# Patient Record
(demographics unavailable — no encounter records)

---

## 2024-12-10 NOTE — HISTORY OF PRESENT ILLNESS
[FreeTextEntry1] : ORIGINAL PRESENTATION: This is a 62-year-old woman complaining of lower back, neck, left shoulder, and bilateral knee pain. The patient has had this pain for many years. The pain started after a work-related injury which took place in 2014. Patient describes pain as moderate to severe. During the last month the pain has been nearly constant with symptoms worse in the morning. Pain described as an pressure-like. Pain is associated with numbness and pins and needles into the left lower extremity. Patient has weakness in the left lower extremity. Pain is increased with lying down, standing, sitting, walking, and coughing/sneezing.  Pain is decreased with we encourage a home exercise program, stressing walking and strengthening of the core. We have recommended exercises such as the modified plank, Luba exercise, elliptical , recumbent bike, as well as shoulder griddle strengthening. We discussed recreational activities such as swimming, Gautam Chi and yoga. Use things that heat like hot shower or icy heat before rehab and exercising and at the beginning of the day, and ice (ice in a bag never directly on the skin) after activity and at the end of the day.. Pain is not changed with, relaxation, and bowel movements. Bowel or bladder habits have not changed.   ACTIVITIES: Patient could walk a few blocks before the pain starts. Patient can sit 10 minutes before pain starts. Patient can stand 5 to 10 minutes before pain starts. The patient sometimes lays down because of pain. Patient uses a cane at this time. Patient has difficulty going to work and exercising at this time.  PRIOR PAIN TREATMENTS: Moderate relief with surgery, nerve block/injection, physical therapy, and chiropractic manipulation.  PRIOR PAIN MEDICATIONS: Hydrocodone, Tylenol, oxycodone, and tramadol  PATIENT PRESENTS FOR FOLLOW UP: Patient presents for a revisit. There has been no new complaints or acute changes since last visit.  Patient continues to have lower back pain, neck, bilateral knee pain and L shoulder pain. She sees Dr. Hung in orthopedics. She underwent a left knee cortisone injection in May 2024 and underwent bilateral gel injections in October.   She is currently undergoing PT for her back and knees, which helps her. She is S/P L5-S1 interlaminar KAYLA on 1/9/24 and states that there is still 50% pain reduction and improvement of functioning after the injection. She has wanted to hold off on another lumbar injection.

## 2024-12-10 NOTE — DATA REVIEWED
[FreeTextEntry1] : MRI of the lumbar spine dated 11/30/2020: Grade 1 out of 2 anterolisthesis at L4-5 with moderate to severe spinal stenosis and bilateral neuroforaminal narrowing identified.  Mild disc bulging at L5-S1 with moderate left and mild right neuroforaminal narrowing noted.  Bilateral facet arthropathy at L4-5 and L5-S1.  Cholelithiasis.  SOAPP: low risk 12/01/23 Patient has combination of a low risk SOAP and no risk factors. UDS would be repeated randomly every quarter.  UDS: No data obtained today.  [place] REGISTRY: Checked.

## 2024-12-10 NOTE — DISCUSSION/SUMMARY
[de-identified] : This is a 62-year-old female with complaints of lower back, left shoulder, and bilateral knee pain. The patient has had this pain for many years. The pain started after a work-related injury which took place in 2014. She is S/P L5-S1 interlaminar KAYLA. She states that she is feeling 50% with the injection. She will hold off on further injections for now. She is seeing Dr. Hung for her knees. She will continue with PT. Patient will follow up in 3 months. She is aware to contact me with any questions or concerns in the interim.  Total clinician time spent today on the patient is 30 minutes including preparing to see the patient, obtaining and/or reviewing and confirming history, performing medically necessary and appropriate examination, counseling and educating the patient and/or family, documenting clinical information in the EHR and communicating and/or referring to other healthcare professionals.  MINA Sarkar MD

## 2024-12-10 NOTE — PHYSICAL EXAM
[Bilateral] : knee bilaterally [] : tenderness [de-identified] : GENERAL APPEARANCE OF PATIENT IS WELL DEVELOPED, WELL NOURISHED, BODY HABITUS NORMAL, WELL GROOMED, NO DEFORMITIES NOTED.  Head - Atraumatic and Normocephalic  Eyes, Nose, and Throat: External inspection of ears and nose are normal overall without scars, lesions, or masses noted. Assessment of hearing is normal Neck-Examination of neck shows no masses, overall appearance is normal, neck is symmetric, tracheal position is midline, no crepitus is noted. Examination of thyroid shows no enlargement, tenderness or masses Respiratory- Assessment of respiratory effort shows no intercostal retractions, no use of accessory muscles, unlabored breathing, and normal diaphragmatic movement. Cardiovascular- Examination of extremities show no edema or varicosities Musculoskeletal. Examination of gait is not antalgic and station is normal Inspection and palpation of digits and nails shows no clubbing, cyanosis, nodules, drainage, fluctuance, petechiae   Spine - straight leg positive on left. Bilateral facet tenderness at L3-S1.     Neck- pain at end range flexion. Left C3-6 facet tenderness and loading.     RUE- 170 degrees in abduction bilaterally.     LUE- pain in the triceps and biceps on the left.     RLE- inspection and palpation shows no misalignment, asymmetry, crepitation, defects, tenderness, masses, effusions. ROM is normal without crepitation or contracture. No instability or subluxation or laxity is noted. No abnormal movements.    LLE- inspection and palpation shows no misalignment, asymmetry, crepitation, defects, tenderness, masses, effusions. ROM is normal without crepitation or contracture. No instability or subluxation or laxity is noted. No abnormal movements.    Skin- Inspection of skin and subcutaneous tissue shows no rashes, lesions or ulcers. Palpation of skin and subcutaneous tissue shows no rashes, no indurations, subcutaneous nodules or tightening.    Abdomen- Nontender    Neurologic- CN 2-12 are grossly intact. No sensory or motor deficits in the upper and lower extremities. Adequate strength in upper and lower extremities     Psychiatric- Patients judgment and insight are intact. Oriented to time, place and person. Recent and remote memory intact.

## 2025-01-29 NOTE — ASSESSMENT
[FreeTextEntry1] : plateaued with her weight loss  recommended  pause physical therapy for both knees  continue with pain management consultation for her back consider another injection? MRI lumbar spine 11/30/2022 noted moderate multilevel disc disease   She is totally disabled unable to work I will see her in 3 months  I may reevaluate whether or not to repeat the gel she does have follow-up with pain management

## 2025-01-29 NOTE — IMAGING
[de-identified] :  impingement right shoulder with positive Arellano sign negative [place]'s Speed test little weakness some crepitus spasm in the limited motion positive straight leg on left negative on the right both knees have some crepitus antalgic gait limits in flexion mild effusion negative Homans sign\par  \par   MRI lumbar spine 11/30/2022: Grade 1 to anterolisthesis L4-5 with moderate severe stenosis bulging disc L5-S1 moderate left mild right neural foraminal narrowing facet arthritis

## 2025-01-29 NOTE — REASON FOR VISIT
[FreeTextEntry2] : Patient is coming in for WC DOI 02/28/2014 for bilateral knees neck and back. Lost 8 pounds using Aspercreme and Tylenol the gel helped her knees just finished therapy

## 2025-01-29 NOTE — HISTORY OF PRESENT ILLNESS
[de-identified] : Patient Complaint - Patient continues to have pain in her knees left more than right right shoulder and low back.\par  numbness in the left thigh persists \par  she did have a bariatric sleeve procedure November 3rd she has lost 157+ lbs did resume therapy in April for back and\par  shoulders still having some numbness more in the right hand\par  injection left knee last visit\par  History of Present Illness\par  Cortisone injection provided several months ago to the left knee helped for about 3 to 4 weeks she still using the cane\par  she reports spasm in the low back pain in the right shoulder her gait is antalgic to the left she still using tramadol she i\par  still working with the bariatric program bariatric surgery is now anticipated for hopefully end of September or early\par  October\par  Surgery right rotator cuff 01/13/2017\par  She misplaced her cane recently she also reports some numbness in both thighs also some numbness in her right arm\par  finger tips the neurologist she sees is sending her for MRI CT scan I requested I get copies when they are done

## 2025-01-29 NOTE — HISTORY OF PRESENT ILLNESS
[de-identified] : Patient Complaint - Patient continues to have pain in her knees left more than right right shoulder and low back.\par  numbness in the left thigh persists \par  she did have a bariatric sleeve procedure November 3rd she has lost 157+ lbs did resume therapy in April for back and\par  shoulders still having some numbness more in the right hand\par  injection left knee last visit\par  History of Present Illness\par  Cortisone injection provided several months ago to the left knee helped for about 3 to 4 weeks she still using the cane\par  she reports spasm in the low back pain in the right shoulder her gait is antalgic to the left she still using tramadol she i\par  still working with the bariatric program bariatric surgery is now anticipated for hopefully end of September or early\par  October\par  Surgery right rotator cuff 01/13/2017\par  She misplaced her cane recently she also reports some numbness in both thighs also some numbness in her right arm\par  finger tips the neurologist she sees is sending her for MRI CT scan I requested I get copies when they are done

## 2025-01-29 NOTE — IMAGING
[de-identified] :  impingement right shoulder with positive Arellano sign negative [place]'s Speed test little weakness some crepitus spasm in the limited motion positive straight leg on left negative on the right both knees have some crepitus antalgic gait limits in flexion mild effusion negative Homans sign\par  \par   MRI lumbar spine 11/30/2022: Grade 1 to anterolisthesis L4-5 with moderate severe stenosis bulging disc L5-S1 moderate left mild right neural foraminal narrowing facet arthritis

## 2025-03-11 NOTE — PHYSICAL EXAM
[Bilateral] : knee bilaterally [] : tenderness [de-identified] : GENERAL APPEARANCE OF PATIENT IS WELL DEVELOPED, WELL NOURISHED, BODY HABITUS NORMAL, WELL GROOMED, NO DEFORMITIES NOTED.  Head - Atraumatic and Normocephalic  Eyes, Nose, and Throat: External inspection of ears and nose are normal overall without scars, lesions, or masses noted. Assessment of hearing is normal Neck-Examination of neck shows no masses, overall appearance is normal, neck is symmetric, tracheal position is midline, no crepitus is noted. Examination of thyroid shows no enlargement, tenderness or masses Respiratory- Assessment of respiratory effort shows no intercostal retractions, no use of accessory muscles, unlabored breathing, and normal diaphragmatic movement. Cardiovascular- Examination of extremities show no edema or varicosities Musculoskeletal. Examination of gait is not antalgic and station is normal Inspection and palpation of digits and nails shows no clubbing, cyanosis, nodules, drainage, fluctuance, petechiae   Spine - straight leg positive on left. Bilateral facet tenderness at L3-S1.     Neck- pain at end range flexion. Left C3-6 facet tenderness and loading.     RUE- 170 degrees in abduction bilaterally.     LUE- pain in the triceps and biceps on the left.     RLE- inspection and palpation shows no misalignment, asymmetry, crepitation, defects, tenderness, masses, effusions. ROM is normal without crepitation or contracture. No instability or subluxation or laxity is noted. No abnormal movements.    LLE- inspection and palpation shows no misalignment, asymmetry, crepitation, defects, tenderness, masses, effusions. ROM is normal without crepitation or contracture. No instability or subluxation or laxity is noted. No abnormal movements.    Skin- Inspection of skin and subcutaneous tissue shows no rashes, lesions or ulcers. Palpation of skin and subcutaneous tissue shows no rashes, no indurations, subcutaneous nodules or tightening.    Abdomen- Nontender    Neurologic- CN 2-12 are grossly intact. No sensory or motor deficits in the upper and lower extremities. Adequate strength in upper and lower extremities     Psychiatric- Patients judgment and insight are intact. Oriented to time, place and person. Recent and remote memory intact.

## 2025-03-11 NOTE — HISTORY OF PRESENT ILLNESS
[FreeTextEntry1] : ORIGINAL PRESENTATION: This is a 63-year-old woman complaining of lower back, neck, left shoulder, and bilateral knee pain. The patient has had this pain for many years. The pain started after a work-related injury which took place in 2014. Patient describes pain as moderate to severe. During the last month the pain has been nearly constant with symptoms worse in the morning. Pain described as an pressure-like. Pain is associated with numbness and pins and needles into the left lower extremity. Patient has weakness in the left lower extremity. Pain is increased with lying down, standing, sitting, walking, and coughing/sneezing.  Pain is decreased with we encourage a home exercise program, stressing walking and strengthening of the core. We have recommended exercises such as the modified plank, Luba exercise, elliptical , recumbent bike, as well as shoulder griddle strengthening. We discussed recreational activities such as swimming, Gautam Chi and yoga. Use things that heat like hot shower or icy heat before rehab and exercising and at the beginning of the day, and ice (ice in a bag never directly on the skin) after activity and at the end of the day.. Pain is not changed with, relaxation, and bowel movements. Bowel or bladder habits have not changed.   ACTIVITIES: Patient could walk a few blocks before the pain starts. Patient can sit 10 minutes before pain starts. Patient can stand 5 to 10 minutes before pain starts. The patient sometimes lays down because of pain. Patient uses a cane at this time. Patient has difficulty going to work and exercising at this time.  PRIOR PAIN TREATMENTS: Moderate relief with surgery, nerve block/injection, physical therapy, and chiropractic manipulation.  PRIOR PAIN MEDICATIONS: Hydrocodone, Tylenol, oxycodone, and tramadol  PATIENT PRESENTS FOR FOLLOW UP: Patient presents for a revisit. Patient continues to have lower back pain, neck, bilateral knee pain and L shoulder pain. She sees Dr. Hung in orthopedics. She underwent a left knee cortisone injection in May 2024 and underwent bilateral gel injections in October. She reports and increase in her neck and lower back pain. She admits to upper and lower extremity radiculopathy. Her PT sessions for her lower back and knees ran out about 2 months ago. Symptoms have increased since then. She is S/P L5-S1 interlaminar KAYLA on 1/9/24 and states that there is still 50% pain reduction and improvement of functioning after the injection. She feels the injection has worn off. She has been using Lidocaine 4% patches. She cannot take NSAID's due to a history of a gastric sleeve. She was previously taking Tramadol, which WC stopped paying for. She wishes to resume the medication. She is hopeful that the medication will be covered now.

## 2025-03-11 NOTE — DISCUSSION/SUMMARY
[de-identified] : This is a 63-year-old female with complaints of lower back, left shoulder, and bilateral knee pain. The patient has had this pain for many years. The pain started after a work-related injury which took place in 2014. She is seeing Dr. Hung for her knees. Neck and lower back pain has increased. She is having upper and lower extremity radiculopathy.  A MRI of the cervical and lumbar spine has been recommended to assess for interval change. Authorization for PT of the cervical and lumbar spine was requested today. Patient will undergo a UDS. Once completed, Tramadol will be sent to the pharmacy. For now, I have refilled Lidoderm 4% patches. She will follow up in 6 weeks. She is aware to contact me with any questions or concerns in the interim.  Lumbar MRI was ordered to delineate spine pathology such as disc displacement and stenosis. Cervical MRI was ordered to delineate spine pathology such as disc displacement and stenosis.  Physical therapy of the cervical and lumbar spine 2-3 times a week for 4-8 weeks stressing a home exercise program of walking, shoulder girdle strengthening, swimming, elliptical , recumbent bike, Gautam chi and Yoga. Use things that heat like hot shower or icy heat before rehab, exercising and at the beginning of the day, and ice (ice in a bag never directly on the skin) after activity and at the end of the day.  I have consulted the  registry for the purpose of reviewing the patient's controlled substance.  The patient will return to the office in 6 weeks' time and is aware to contact me with any question or concerns in the interim.  MINA Sarkar MD

## 2025-03-17 NOTE — HISTORY OF PRESENT ILLNESS
[de-identified] : 63-year-old female comes in today bilateral Synvisc 1 injection.  Under the care of Dr. Hung.

## 2025-03-17 NOTE — IMAGING
[de-identified] : On examination of the right and left knee no swelling, no ecchymosis, no erythema.  Skin is intact.  Tenderness along the joint line bilaterally.  Calves are soft nontender.  Good motion to knee flexion and extension bilaterally

## 2025-03-19 NOTE — PROCEDURE
[Epistaxis] : evaluation of epistaxis [Anterior rhinoscopy insufficient to account for symptoms] : anterior rhinoscopy insufficient to account for symptoms [Rigid Endoscope] : examined with a rigid endoscope [Normal] : the paranasal sinuses had no abnormalities [FreeTextEntry1] : dry nasAL MUCOSA

## 2025-03-19 NOTE — HISTORY OF PRESENT ILLNESS
[FreeTextEntry1] : Patient returns today following up on dizziness, BPPV, epistaxis. States that she is still having increased dizziness. Feels dizziness has worsened. Especially when laying down. Did not go to Vestibular Therapy. Would like a new script for therapy.  Also noticed that around Dec 2024/ Jan 2025 she had a sinus infection. Since then, she wakes up extremely congested. When blowing her nose she has frequent nosebleeds. Left nostril worse than right nostril. She has stopped Flonase because she finds it worsens her symptoms.

## 2025-03-19 NOTE — ASSESSMENT
[FreeTextEntry1] : I discussed with the patient the pathophysiology of anterior epistaxis. I showed on a drawing the location of the anterior vascular area. I explained the risk factors including but not limited to nasal dryness, and recommended nasal moisturizing and avoiding any intranasal trauma. I also demonstrated how to stop a bleeding if it happens again.

## 2025-04-07 NOTE — ASSESSMENT
[FreeTextEntry1] : CAITLYN ALMEIDA is a 63- year female seen today for bariatric follow up visit. Patient had some weight regain and feels that she will benefit again from working with the dietitian. Breakfast - egg with a 1/2 toasted Hawaiian roll or a Greek yogurt with fruit. Lunch/Dinner - Chicken Gyro which lasts her for several days, seafood platter with spinach and a serving of rice or macaroni. Fluid intake is ~ 20 ounces daily and I encouraged patient to set an alarm on her phone as a reminder to drink and to aim for at least 48-64 ounces daily. Walking daily for 45 minutes. Recommended adding muscle strengthening exercises.

## 2025-04-07 NOTE — PLAN
[FreeTextEntry1] : Plan: Add Folic Acid 1 mg daily.          Increase fluid intake.          Schedule follow up with RD.          Prioritize protein intake.          RTO in 6 months.          Call with concerns.

## 2025-04-07 NOTE — DATA REVIEWED
[FreeTextEntry1] : Blood work reviewed with patient. Will monitor her LDL and I sent a prescription for Folic Acid 1 mg for 1 month to her local pharmacy.

## 2025-04-07 NOTE — HISTORY OF PRESENT ILLNESS
[de-identified] : Patient had surgery approximately 4 1/2 years ago. She saw Dr. Molina a few days ago in consideration for a cholecystectomy. She reported that she has been dealing with multiple stressors at home and has been snacking more. She has not been cooking and wants to refocus on meal planning. Will have RD reach out to her.is cons She denies heartburn/reflux. She reported that she occasionally has to wake up to vomit and patient was encouraged to pay attention to her portions and to sit up for 3 hours before going to bed. Hypertension and knee pain resolved. Back pain improved.
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
## 2025-04-22 NOTE — HISTORY OF PRESENT ILLNESS
[FreeTextEntry1] : ORIGINAL PRESENTATION: This is a 63-year-old woman complaining of lower back, neck, left shoulder, and bilateral knee pain. The patient has had this pain for many years. The pain started after a work-related injury which took place in 2014. Patient describes pain as moderate to severe. During the last month the pain has been nearly constant with symptoms worse in the morning. Pain described as an pressure-like. Pain is associated with numbness and pins and needles into the left lower extremity. Patient has weakness in the left lower extremity. Pain is increased with lying down, standing, sitting, walking, and coughing/sneezing.  Pain is decreased with we encourage a home exercise program, stressing walking and strengthening of the core. We have recommended exercises such as the modified plank, Luba exercise, elliptical , recumbent bike, as well as shoulder griddle strengthening. We discussed recreational activities such as swimming, Gautam Chi and yoga. Use things that heat like hot shower or icy heat before rehab and exercising and at the beginning of the day, and ice (ice in a bag never directly on the skin) after activity and at the end of the day.. Pain is not changed with, relaxation, and bowel movements. Bowel or bladder habits have not changed.   ACTIVITIES: Patient could walk a few blocks before the pain starts. Patient can sit 10 minutes before pain starts. Patient can stand 5 to 10 minutes before pain starts. The patient sometimes lays down because of pain. Patient uses a cane at this time. Patient has difficulty going to work and exercising at this time.  PRIOR PAIN TREATMENTS: Moderate relief with surgery, nerve block/injection, physical therapy, and chiropractic manipulation.  PRIOR PAIN MEDICATIONS: Hydrocodone, Tylenol, oxycodone, and tramadol  PATIENT PRESENTS FOR FOLLOW UP: Patient presents for a revisit. Patient continues to have lower back pain, neck, bilateral knee pain and L shoulder pain. She sees Dr. Hung in orthopedics. She underwent a left knee cortisone injection in May 2024 and underwent bilateral gel injections in October. She reports an increase in her neck and lower back pain. She admits to upper and lower extremity radiculopathy. Her PT sessions for her lower back and knees ran out about 2 months ago. Symptoms have increased since then. She is S/P L5-S1 interlaminar KAYLA on 1/9/24 and states that there was 50% pain reduction and improvement of functioning after the injection. She feels the injection has worn off. PT for her cervical and lumbar spine was approved since last visit, which she never started. She has been using Lidocaine 4% patches. She cannot take NSAID's due to a history of a gastric sleeve. She was previously taking Tramadol, which WC stopped paying for. She has wanted to resume the medication. The medication was sent to her pharmacy on her last visit, but she is unsure if it was ever filled for her. I will re-send the medication for her today.  Patient underwent MRI scans of the lumbar and cervical spine at [facility] since her last visit. I was only able to review the lumbar MRI which shows a disc bulge with facet arthropathy causing bilateral foraminal stenosis at L3-4. Mild anterolisthesis at L4-5 with central canal stenosis at this level. Bulge and facet hypertrophy with bilateral foraminal stenosis noted. Central disc herniation at L5-S1 with bilateral foraminal stenosis.  UDS: 3/11/25 - Negative.

## 2025-04-22 NOTE — PHYSICAL EXAM
[Bilateral] : knee bilaterally [] : tenderness [de-identified] : GENERAL APPEARANCE OF PATIENT IS WELL DEVELOPED, WELL NOURISHED, BODY HABITUS NORMAL, WELL GROOMED, NO DEFORMITIES NOTED.  Head - Atraumatic and Normocephalic  Eyes, Nose, and Throat: External inspection of ears and nose are normal overall without scars, lesions, or masses noted. Assessment of hearing is normal Neck-Examination of neck shows no masses, overall appearance is normal, neck is symmetric, tracheal position is midline, no crepitus is noted. Examination of thyroid shows no enlargement, tenderness or masses Respiratory- Assessment of respiratory effort shows no intercostal retractions, no use of accessory muscles, unlabored breathing, and normal diaphragmatic movement. Cardiovascular- Examination of extremities show no edema or varicosities Musculoskeletal. Examination of gait is not antalgic and station is normal Inspection and palpation of digits and nails shows no clubbing, cyanosis, nodules, drainage, fluctuance, petechiae   Spine - straight leg positive on left. Bilateral facet tenderness at L3-S1.     Neck- pain at end range flexion. Left C3-6 facet tenderness and loading.     RUE- 170 degrees in abduction bilaterally.     LUE- pain in the triceps and biceps on the left.     RLE- inspection and palpation shows no misalignment, asymmetry, crepitation, defects, tenderness, masses, effusions. ROM is normal without crepitation or contracture. No instability or subluxation or laxity is noted. No abnormal movements.    LLE- inspection and palpation shows no misalignment, asymmetry, crepitation, defects, tenderness, masses, effusions. ROM is normal without crepitation or contracture. No instability or subluxation or laxity is noted. No abnormal movements.    Skin- Inspection of skin and subcutaneous tissue shows no rashes, lesions or ulcers. Palpation of skin and subcutaneous tissue shows no rashes, no indurations, subcutaneous nodules or tightening.    Abdomen- Nontender    Neurologic- CN 2-12 are grossly intact. No sensory or motor deficits in the upper and lower extremities. Adequate strength in upper and lower extremities     Psychiatric- Patients judgment and insight are intact. Oriented to time, place and person. Recent and remote memory intact.

## 2025-04-22 NOTE — DISCUSSION/SUMMARY
[de-identified] : This is a 63-year-old female with complaints of lower back, left shoulder, and bilateral knee pain. The patient has had this pain for many years. The pain started after a work-related injury which took place in 2014. She is seeing Dr. Hung for her knees. Neck and lower back pain has increased. She is having upper and lower extremity radiculopathy. She is aware of her lumbar MRI results. We will have [facility] fax her cervical MRI findings. I will call the patient to review. She will start PT of the cervical and lumbar spine was requested today. Tramadol was re-sent to the pharmacy. She will continue with Lidoderm 4% patches. She will follow up in 6-8 weeks. She is aware to contact me with any questions or concerns in the interim.  I have consulted the  registry for the purpose of reviewing the patient's controlled substance.  The patient will return to the office in 6-8 weeks' time and is aware to contact me with any question or concerns in the interim.  MINA Sarkar MD

## 2025-04-29 NOTE — IMAGING
[de-identified] :  impingement right shoulder with positive Arellano sign negative [place]'s Speed test little weakness some crepitus spasm in the limited motion positive straight leg on left negative on the right both knees have some crepitus antalgic gait limits in flexion mild effusion negative Homans sign\par  \par   MRI lumbar spine 11/30/2022: Grade 1 to anterolisthesis L4-5 with moderate severe stenosis bulging disc L5-S1 moderate left mild right neural foraminal narrowing facet arthritis

## 2025-04-29 NOTE — HISTORY OF PRESENT ILLNESS
[de-identified] : Patient Complaint - Patient continues to have pain in her knees left more than right right shoulder and low back.\par  numbness in the left thigh persists \par  she did have a bariatric sleeve procedure November 3rd she has lost 157+ lbs did resume therapy in April for back and\par  shoulders still having some numbness more in the right hand\par  injection left knee last visit\par  History of Present Illness\par  Cortisone injection provided several months ago to the left knee helped for about 3 to 4 weeks she still using the cane\par  she reports spasm in the low back pain in the right shoulder her gait is antalgic to the left she still using tramadol she i\par  still working with the bariatric program bariatric surgery is now anticipated for hopefully end of September or early\par  October\par  Surgery right rotator cuff 01/13/2017\par  She misplaced her cane recently she also reports some numbness in both thighs also some numbness in her right arm\par  finger tips the neurologist she sees is sending her for MRI CT scan I requested I get copies when they are done

## 2025-04-29 NOTE — ASSESSMENT
[FreeTextEntry1] : she has plateaued with her weight loss  recommended  pause in physical therapy for both knees  continue with pain management consultation for her back consider another injection? MRI lumbar spine 11/30/2022 noted moderate multilevel disc disease   She is totally disabled unable to work I will see her in 3 months I may reevaluate whether or not to repeat the gel she does have follow-up with pain management  Given prescription for bilateral patellar cutout sleeves

## 2025-04-29 NOTE — REASON FOR VISIT
[FreeTextEntry2] : Patient is coming in for WC DOI 02/28/2014 for bilateral knees neck and back Had gel injections last visit helped a little more on the right than left still seeing pain management weight is stable Taking Tylenol using Aspercreme therapies over

## 2025-04-29 NOTE — IMAGING
[de-identified] :  impingement right shoulder with positive Arellano sign negative [place]'s Speed test little weakness some crepitus spasm in the limited motion positive straight leg on left negative on the right both knees have some crepitus antalgic gait limits in flexion mild effusion negative Homans sign\par  \par   MRI lumbar spine 11/30/2022: Grade 1 to anterolisthesis L4-5 with moderate severe stenosis bulging disc L5-S1 moderate left mild right neural foraminal narrowing facet arthritis

## 2025-04-29 NOTE — HISTORY OF PRESENT ILLNESS
[de-identified] : Patient Complaint - Patient continues to have pain in her knees left more than right right shoulder and low back.\par  numbness in the left thigh persists \par  she did have a bariatric sleeve procedure November 3rd she has lost 157+ lbs did resume therapy in April for back and\par  shoulders still having some numbness more in the right hand\par  injection left knee last visit\par  History of Present Illness\par  Cortisone injection provided several months ago to the left knee helped for about 3 to 4 weeks she still using the cane\par  she reports spasm in the low back pain in the right shoulder her gait is antalgic to the left she still using tramadol she i\par  still working with the bariatric program bariatric surgery is now anticipated for hopefully end of September or early\par  October\par  Surgery right rotator cuff 01/13/2017\par  She misplaced her cane recently she also reports some numbness in both thighs also some numbness in her right arm\par  finger tips the neurologist she sees is sending her for MRI CT scan I requested I get copies when they are done

## 2025-06-12 NOTE — HISTORY OF PRESENT ILLNESS
[FreeTextEntry1] : ORIGINAL PRESENTATION: This is a 63-year-old woman complaining of lower back, neck, left shoulder, and bilateral knee pain. The patient has had this pain for many years. The pain started after a work-related injury which took place in 2014. Patient describes pain as moderate to severe. During the last month the pain has been nearly constant with symptoms worse in the morning. Pain described as an pressure-like. Pain is associated with numbness and pins and needles into the left lower extremity. Patient has weakness in the left lower extremity. Pain is increased with lying down, standing, sitting, walking, and coughing/sneezing.  Pain is decreased with we encourage a home exercise program, stressing walking and strengthening of the core. We have recommended exercises such as the modified plank, Luba exercise, elliptical , recumbent bike, as well as shoulder griddle strengthening. We discussed recreational activities such as swimming, Gautam Chi and yoga. Use things that heat like hot shower or icy heat before rehab and exercising and at the beginning of the day, and ice (ice in a bag never directly on the skin) after activity and at the end of the day.. Pain is not changed with, relaxation, and bowel movements. Bowel or bladder habits have not changed.   ACTIVITIES: Patient could walk a few blocks before the pain starts. Patient can sit 10 minutes before pain starts. Patient can stand 5 to 10 minutes before pain starts. The patient sometimes lays down because of pain. Patient uses a cane at this time. Patient has difficulty going to work and exercising at this time.  PRIOR PAIN TREATMENTS: Moderate relief with surgery, nerve block/injection, physical therapy, and chiropractic manipulation.  PRIOR PAIN MEDICATIONS: Hydrocodone, Tylenol, oxycodone, and tramadol  PATIENT PRESENTS FOR FOLLOW UP: Patient presents for a revisit. Patient continues to have lower back pain, neck, bilateral knee pain and L shoulder pain. She sees Dr. Hung in orthopedics. She underwent a left knee cortisone injection in May 2024 and underwent bilateral gel injections in October. She reports an increase in her neck and lower back pain. She admits to upper and lower extremity radiculopathy. She is S/P L5-S1 interlaminar KAYLA on 1/9/24 and states that there was 50% pain reduction and improvement of functioning after the injection. Since her last visit, she started PT for her cervical and lumbar spine. She will continue with it. If no relief, we can discuss injection therapy in the future.  She has been using Lidocaine 4% patches. She cannot take NSAID's due to a history of a gastric sleeve. She was previously taking Tramadol, which [facility] stopped paying for. She has wanted to resume the medication. The medication was sent to her pharmacy multiples times. She was approved for it, but the pharmacist told her she wasn't.  UDS: 3/11/25 - Negative.

## 2025-06-12 NOTE — DATA REVIEWED
[FreeTextEntry1] : Lumbar MRI which shows a disc bulge with facet arthropathy causing bilateral foraminal stenosis at L3-4. Mild anterolisthesis at L4-5 with central canal stenosis at this level. Bulge and facet hypertrophy with bilateral foraminal stenosis noted. Central disc herniation at L5-S1 with bilateral foraminal stenosis.  MRI Cervical spine: C3-4 disc bulge. Central disc herniation with central canal stenosis at C4-5. Facet hypertrophy causing bilateral foraminal stenosis at this level. Disc bulge at C5-6 with facet hypertrophy causing bilateral foraminal stenosis at this level. Central disc herniation at C6-7 with facet hypertrophy causing bilateral foraminal stenosis at this level.  SOAPP: low risk 12/01/23 Patient has combination of a low risk SOAP and no risk factors. UDS would be repeated randomly every quarter.  UDS: No data obtained today.  [place] REGISTRY: Checked.

## 2025-06-12 NOTE — PHYSICAL EXAM
[Bilateral] : knee bilaterally [] : tenderness [de-identified] : GENERAL APPEARANCE OF PATIENT IS WELL DEVELOPED, WELL NOURISHED, BODY HABITUS NORMAL, WELL GROOMED, NO DEFORMITIES NOTED.  Head - Atraumatic and Normocephalic  Eyes, Nose, and Throat: External inspection of ears and nose are normal overall without scars, lesions, or masses noted. Assessment of hearing is normal Neck-Examination of neck shows no masses, overall appearance is normal, neck is symmetric, tracheal position is midline, no crepitus is noted. Examination of thyroid shows no enlargement, tenderness or masses Respiratory- Assessment of respiratory effort shows no intercostal retractions, no use of accessory muscles, unlabored breathing, and normal diaphragmatic movement. Cardiovascular- Examination of extremities show no edema or varicosities Musculoskeletal. Examination of gait is not antalgic and station is normal Inspection and palpation of digits and nails shows no clubbing, cyanosis, nodules, drainage, fluctuance, petechiae   Spine - straight leg positive on left. Bilateral facet tenderness at L3-S1.     Neck- pain at end range flexion. Left C3-6 facet tenderness and loading.     RUE- 170 degrees in abduction bilaterally.     LUE- pain in the triceps and biceps on the left.     RLE- inspection and palpation shows no misalignment, asymmetry, crepitation, defects, tenderness, masses, effusions. ROM is normal without crepitation or contracture. No instability or subluxation or laxity is noted. No abnormal movements.    LLE- inspection and palpation shows no misalignment, asymmetry, crepitation, defects, tenderness, masses, effusions. ROM is normal without crepitation or contracture. No instability or subluxation or laxity is noted. No abnormal movements.    Skin- Inspection of skin and subcutaneous tissue shows no rashes, lesions or ulcers. Palpation of skin and subcutaneous tissue shows no rashes, no indurations, subcutaneous nodules or tightening.    Abdomen- Nontender    Neurologic- CN 2-12 are grossly intact. No sensory or motor deficits in the upper and lower extremities. Adequate strength in upper and lower extremities     Psychiatric- Patients judgment and insight are intact. Oriented to time, place and person. Recent and remote memory intact.

## 2025-06-12 NOTE — DISCUSSION/SUMMARY
Progress Notes by Ruthie Solis at 17 12:50 PM     Author:  Ruthie Solis Service:  (none) Author Type:  Patient      Filed:  17 01:01 PM Encounter Date:  2017 Status:  Signed     :  Ruthie Solis (Patient )            MRN [id number] SCAN TO[CV1.1T] 2017[CV1.1M] AUTHORIZATION ENCOUNTER    Therapy Insurance Authorization Form   Patients name:  Gab Alfonso     Patient  1988      Benefits for:[CV1.1T] Physical Therapy & Occupational Therapy  [facility] is In Network[CV1.1M]    Primary Insurance  Insurance company:[CV1.1T] [facility][CV1.1M] Phone number:[CV1.1T] [phone][CV1.2C]   Contact name:[CV1.1T] Melina[CV1.2M] Policy number:[CV1.1T] [id number][CV1.1C]   Effective date:[CV1.1T] 2015[CV1.2M] Patient  1988      Plan Type:[CV1.1T] [employer] + [employer][CV1.2M]  Co-pay amount(if applicable): $[CV1.1T]20[CV1.2M]  Deductible: $[CV1.1T]0[CV1.2M]  Deductible met:[CV1.1T] N/A[CV1.2M]  Co-Insurance:[CV1.1T] 100[CV1.2M]%  Out of pocket:$[CV1.1T]1500[CV1.2M]   Out of pocket met:[CV1.1T] No[CV1.2M] remains $[CV1.1T]1480[CV1.2C]  Benefit maximums:[CV1.1T] 100[CV1.2M] Visits per calendar year ([CV1.1T]0[CV1.2M] visits used this year to date)[CV1.1T] Separate/Hard max[CV1.2M]  Are there a limited number of units/modalities per day?[CV1.1T] 4 Per Day per provider[CV1.2M]  Precert needed:[CV1.1T] No[CV1.2M]   Carve Out:[CV1.1T] No[CV1.2M]  Additional comments:[CV1.1T] 0412[CV1.2M]         Revision History        User Key Date/Time User Provider Type Action    > CV1.2 17 01:01 PM Ruthie Solis Patient  Sign     CV1.1 17 12:50 PM Ruthie Solis Patient      C - Copied, M - Manual, T - Template             [de-identified] : This is a 63-year-old female with complaints of lower back, left shoulder, and bilateral knee pain. The patient has had this pain for many years. The pain started after a work-related injury which took place in 2014. She is seeing Dr. Hung for her knees. Neck and lower back pain has increased. She is having upper and lower extremity radiculopathy. She will continue PT of the cervical and lumbar spine. Tramadol and Lidocaine patches were re-sent to the pharmacy. She will speak to the pharmacist regarding her approvals. She will follow up in 8 weeks. She is aware to contact me with any questions or concerns in the interim.  I have consulted the  registry for the purpose of reviewing the patient's controlled substance.  The patient will return to the office in 8 weeks' time and is aware to contact me with any question or concerns in the interim.  MINA Sarkar MD

## 2025-07-25 NOTE — HISTORY OF PRESENT ILLNESS
[FreeTextEntry1] : Pt returns today following up on dizziness, dry nose. Reports that she is doing well. Went to Vestibular therapy with improvement. Occasional slight dizziness. Less frequency. Using exercises with improvement. Occasionally still experiencing nosebleeds. Notice it has lessened. Last bleeding was 2 days ago. Also, would like hearing checked today. Her kids have noticed she is having trouble hearing and needing to repeat themselves.  No further complaints or concerns.

## 2025-07-25 NOTE — REASON FOR VISIT
[Subsequent Evaluation] : a subsequent evaluation for [FreeTextEntry2] : dizziness, dry nose, hearing test.

## 2025-07-25 NOTE — ASSESSMENT
[FreeTextEntry1] : I advised the patient to stop flonase due to nosebleeds and use claritin.  I discussed with the patient the pathophysiology of anterior epistaxis. I showed on a drawing the location of the anterior vascular area. I explained the risk factors including but not limited to nasal dryness, and recommended nasal moisturizing and avoiding any intranasal trauma. I also demonstrated how to stop a bleeding if it happens again.  Improved dizziness. Patient to repeat her hearing test. She has no time today, will schedule it.

## 2025-07-25 NOTE — PROCEDURE
[Epistaxis] : evaluation of epistaxis [Anterior rhinoscopy insufficient to account for symptoms] : anterior rhinoscopy insufficient to account for symptoms [Normal] : the nasopharynx was normal [FreeTextEntry1] : dry irritated mucosa

## 2025-07-29 NOTE — IMAGING
[de-identified] :  impingement right shoulder with positive Arellano sign negative [place]'s Speed test little weakness some crepitus spasm in the limited motion positive straight leg on left negative on the right both knees have some crepitus antalgic gait limits in flexion mild effusion negative Homans sign\par  \par   MRI lumbar spine 11/30/2022: Grade 1 to anterolisthesis L4-5 with moderate severe stenosis bulging disc L5-S1 moderate left mild right neural foraminal narrowing facet arthritis
[de-identified] :  impingement right shoulder with positive Arellano sign negative [place]'s Speed test little weakness some crepitus spasm in the limited motion positive straight leg on left negative on the right both knees have some crepitus antalgic gait limits in flexion mild effusion negative Homans sign\par  \par   MRI lumbar spine 11/30/2022: Grade 1 to anterolisthesis L4-5 with moderate severe stenosis bulging disc L5-S1 moderate left mild right neural foraminal narrowing facet arthritis
details…

## 2025-07-29 NOTE — HISTORY OF PRESENT ILLNESS
[de-identified] : Patient Complaint - Patient continues to have pain in her knees left more than right right shoulder and low back.\par  numbness in the left thigh persists \par  she did have a bariatric sleeve procedure November 3rd she has lost 157+ lbs did resume therapy in April for back and\par  shoulders still having some numbness more in the right hand\par  injection left knee last visit\par  History of Present Illness\par  Cortisone injection provided several months ago to the left knee helped for about 3 to 4 weeks she still using the cane\par  she reports spasm in the low back pain in the right shoulder her gait is antalgic to the left she still using tramadol she i\par  still working with the bariatric program bariatric surgery is now anticipated for hopefully end of September or early\par  October\par  Surgery right rotator cuff 01/13/2017\par  She misplaced her cane recently she also reports some numbness in both thighs also some numbness in her right arm\par  finger tips the neurologist she sees is sending her for MRI CT scan I requested I get copies when they are done

## 2025-07-29 NOTE — ASSESSMENT
[FreeTextEntry1] : she has plateaued with her weight loss  recommended  pause in physical therapy for both knees  continue with pain management consultation for her back consider another injection? MRI lumbar spine 11/30/2022 noted moderate multilevel disc disease   She is totally disabled unable to work I will see her in 3 months I may reevaluate whether or not to repeat the gel she does have follow-up with pain management  Given prescription for bilateral patellar cutout sleeves Please put request in for MRI of the left knee rule out meniscal tear may consider repeating the gel in a couple months

## 2025-07-29 NOTE — HISTORY OF PRESENT ILLNESS
[de-identified] : Patient Complaint - Patient continues to have pain in her knees left more than right right shoulder and low back.\par  numbness in the left thigh persists \par  she did have a bariatric sleeve procedure November 3rd she has lost 157+ lbs did resume therapy in April for back and\par  shoulders still having some numbness more in the right hand\par  injection left knee last visit\par  History of Present Illness\par  Cortisone injection provided several months ago to the left knee helped for about 3 to 4 weeks she still using the cane\par  she reports spasm in the low back pain in the right shoulder her gait is antalgic to the left she still using tramadol she i\par  still working with the bariatric program bariatric surgery is now anticipated for hopefully end of September or early\par  October\par  Surgery right rotator cuff 01/13/2017\par  She misplaced her cane recently she also reports some numbness in both thighs also some numbness in her right arm\par  finger tips the neurologist she sees is sending her for MRI CT scan I requested I get copies when they are done

## 2025-07-29 NOTE — REASON FOR VISIT
[FreeTextEntry2] :  [facility] DOI 02/28/2014 for bilateral knees neck and back Left knee is gotten worse lately last gel injection was 3/17/2025 Has been using Tylenol Aspercreme PT ended